# Patient Record
Sex: MALE | Race: WHITE
[De-identification: names, ages, dates, MRNs, and addresses within clinical notes are randomized per-mention and may not be internally consistent; named-entity substitution may affect disease eponyms.]

---

## 2020-03-27 ENCOUNTER — HOSPITAL ENCOUNTER (OUTPATIENT)
Dept: HOSPITAL 76 - SDS | Age: 60
Discharge: HOME | End: 2020-03-27
Attending: ORTHOPAEDIC SURGERY
Payer: COMMERCIAL

## 2020-03-27 VITALS — DIASTOLIC BLOOD PRESSURE: 76 MMHG | SYSTOLIC BLOOD PRESSURE: 130 MMHG

## 2020-03-27 DIAGNOSIS — M19.011: ICD-10-CM

## 2020-03-27 DIAGNOSIS — S46.011A: Primary | ICD-10-CM

## 2020-03-27 DIAGNOSIS — I10: ICD-10-CM

## 2020-03-27 DIAGNOSIS — Z87.891: ICD-10-CM

## 2020-03-27 DIAGNOSIS — S46.111A: ICD-10-CM

## 2020-03-27 NOTE — OPERATIVE REPORT
Operative Report





- Other


Other Information/Narrative: 


Date of Surgery: 27 March 2020





Pre-Op Diagnosis: Acute, traumatic supraspinatus, infraspinatus, subscapularis, 

and biceps tendon tears





Procedure: Right shoulder arthroscopy with subscapularis repair.  Open biceps 

tenodesis.  Open repair of supraspinatus and infraspinatus





Postop Diagnosis: Same





Primary Surgeon: Anthony Coronado





Secondary Surgeon: Abiodun Chen





Complications: None





EBL: 50





IMPLANTS: 


Arthrex 5.5 mm corkscrew x4


Arthrex swivel lock x2


Arthrex fiber tack x1





POSTOPERATIVE PLAN:


0-2 weeks-Sling at all times.  Pendulum exercises 5 times per day.  


2-6 weeks-Passive range of motion.  External rotation to neutral.  Abduction to 

90.  Forward flexion to 90 with the palm down.  No active flexion of the elbow.


6-16 weeks-Active and passive range of motion but no strengthening.


16 weeks and beyond-gradual strengthening of the rotator cuff





EXAMINATION UNDER ANESTHESIA:


ROM: Full


Anterior load and shift: Normal


Posterior load and shift: Normal


Inferior sulcus: Normal





ARTHROSCOPIC FINDINGS: 


Rotator interval: Disruption of the rotator cuff leading to abnormality of the 

interval


Biceps tendon & SLAP: Partial tear of the biceps tendon that was longitudinal.  

Biceps tenodesis performed


Subscapularis: Partial tear of the upper one half of the tendon, this was 

repaired


Rotator Cuff: Complete tear of supraspinatus and infraspinatus with a 

longitudinal split between the 2.  These were repaired


HAGL: Normal


Labrum: Mild degenerative change


Glenoid Cartilage: Mild degenerative change


Humeral Head Cartilage: Mild degenerative change





INDICATION FOR SURGERY: 59-year-old male fell while skiing on 12 March and 

sustained a traumatic tear of the rotator cuff and subsequently had inability to

lift his arms.  MRI confirmed these findings.  He was indicated for operative 

management urgently to allow for acute repair of these tendon avulsions and 

prevent them from becoming an ear repairable tear requiring shoulder 

arthroplasty.  The risks, benefits, and alternatives were discussed.  Risks 

included pain, bleeding, infection, damage to nearby structures, lack of symptom

relief, implant complications, stiffness, need for further surgeries, DVT, PE, 

stroke, and even death.  He signed a written consent form.





PROCEDURE IN DETAIL: The patient was met in the preoperative holding on the day 

of the procedure.  Operative extremity was signed.  Consent was verified.  They 

desired to proceed.  Regional anesthesia was obtained in the preoperative area. 

They were brought to the operating room and surrendered to anesthesia.  Once 

general anesthesia was obtained they were placed in the lateral decubitus 

position with the operative side up.  An axillary roll was placed and all bony 

prominences were well-padded.  A surgical timeout was held to confirm the 

patient procedure, identity, procedure, laterality, allergies, images, and 

antibiotics.  All were in agreement we proceeded.  





A standard diagnostic arthroscopy was performed utilizing posterior and 

anterosuperior portals.  The anterosuperior portal was created under direct 

visualization and localized with a spinal needle.  The 7 mm cannula was placed 

anteriorly.  The findings of the diagnostic arthroscopy can be found above.  The

biceps tendon was taken from its insertion on the labrum.  General debridement 

of the joint with a shaver and burner was done for visualization.





I then performed the repair of the subscapularis.  The rotator interval was 

opened with a shaver and burner and the backside of the coracoid was identified 

and cleared from underlying tissue.  There was no subcoracoid impingement.  A 3 

sided release of the subscapularis was performed using an elevator.  A 

[place] confirmed that the subscapularis could be reduced nicely without 

undue tension on the middle glenohumeral ligament.  A mid glenoid portal was 

created with a good trajectory for subscapularis anchors.  I then debrided to 

the insertion site and created a bed of bleeding bone.  A single triple loaded 

anchor was then placed into the bone at the level of the upper one third of the 

tendon.  I then used the crescent lasso to pass the suture in the appropriate 

locations creating an inferior horizontal mattress, and a superior horizontal 

mattress, and a cinch stitch over the superior border.  After all sutures were 

passed I then tied from inferior to superior and noted the tendon reduced very 

nicely and was well fixed at its anatomic footprint.  This was tested with a 

probe and final pictures were then taken.





SUBACROMIAL DECOMPRESSION: The instruments and cannula were then removed from 

the glenohumeral joint.  The scope trocar was placed in the posterior portal and

the acromion was felt.  It was then inserted just under the acromion scraping 

along the bone until the CA ligament was felt.  The scope trocar was then 

brought just lateral to the CA ligament and out the anterior incision.  The 

cannula was then brought over the scope trocar arthroscope were inserted.  The 

arthroscope was backed up until the shaver and arthroscope in the subacromial 

space.  I then systemically debrided the bursa using a sucker shaver and 

radiofrequency ablation wand.  A direct lateral incision was made and the 

bursectomy and decompression was completed through the lateral incision.  All 

soft tissue was debrided from the underside of the acromion and the posterior 

edge of the CA ligament was lifted.  Care was taken to keep the deltoid fascia 

intact.  I identified in the shape and pattern of the rotator cuff tear and 

found the supraspinatus to be completely off and the infraspinatus to be 

completely off.  I freed up the tendon of the supraspinatus and infraspinatus 

and ensured that it could be reduced to its footprint.





I then created a longitudinal incision laterally over the deltoid in line with 

the rotator cuff.  I did not extend this incision beyond 5 cm from the acromial 

edge.  A deltoid raphae was used and exploited and the sub-deltoid fascia was 

opened.  The Derra retractor was used to debride the underside of the acromion 

and to finalize the mobilization of the cuff.  I then released the subdeltoid 

fascia all the way up to the acromion and retractors were placed.  The greater 

tuberosity was debrided of all soft tissue and abraded until it was a bed of 

bleeding bone.  I then placed traction sutures in the infraspinatus and 

supraspinatus to ensure good mobility.  I then planned my repair.  I 

sequentially placed an anterior, central, and posterior medial row anchor 

ensuring to leave the bare area under the infraspinatus.  I sequentially passed 

my sutures from anterior to posterior incorporating the comma tissue and 

supraspinatus into my first anchor, the supraspinatus and infraspinatus into the

central anchor, and the infraspinatus in the posterior anchor.  I then tied the 

medial row and found excellent compression and anatomic reduction.  The marginal

tear had reduced nicely and there was slight overlapping of the supraspinatus 

and infraspinatus.  I then  1 suture from each not leaving me with 6 

sutures for each lateral row anchor.  The anterior lateral row anchor was put 

into place and each suture was tensioned individually then the anchor was fixed.

 I repeated this posteriorly.  The repair was excellent and moved with the 

humeral head through a near full range of motion.





MINI OPEN BICEPS TENODESIS: A 5 cm incision was made near the axillary fold 

centered over the pectoralis major tendon.  Electrocautery was used to obtain 

hemostasis.  The fascia was opened with dissection scissors.  Blunt digital 

dissection was used to identify the intertubercular groove just under the 

pectoralis major tendon.  The long head of the biceps tendon was visualized 

within this interval.  The short head of the biceps was retracted with my finger

and the right angle was used to deliver the tendon of the long head of the 

biceps out of the wound.  A key elevator was then used to debride all synovial 

tissue from the intertubercular groove.  A fibertack was placed high within the 

groove.  Both limbs of the fibertack were pulled on and it was well fixed.  I 

then whipstitched the biceps tendon starting 2 cm proximal to the 

musculotendinous junction down to the musculotendinous junction and back up to 

the same 2 cm location with a single limb of the suture tack.  The other suture 

was placed once through the tendon at the 2 cm location.  I then cut all excess 

tendon off.  The suture limb that was passed the single time was then pulled on 

and this reduced the tendon nicely into the groove.  The elbow was fully 

straightened and there was no excess tension on the repair site.  I then tied 7 

reverse half hitches alternating to secure the tendon in its place.  All wounds 

were then irrigated copiously.





The portal sites were then closed with 3-0 Monocryl buried.  Any open incisions 

were closed with 2-0 Vicryl in the dermis and a running 3-0 Monocryl in the 

skin.  Mastisol and Steri-Strips were applied.  A sterile dressing and a sling 

was applied.  A sling was placed.  The patient was awakened and transferred to 

the recovery room.

## 2020-03-27 NOTE — ANESTHESIA
Pre-Anesthesia VS, & Labs





- Diagnosis


right shoulder RC tear








- Procedure





right shoulder rotator cuff repair, open biceps tenodesis


Vital Signs: 





                                        











Temp Pulse Resp BP Pulse Ox


 


 36.6 C   104 H  16   179/103 H  98 


 


 03/27/20 06:31  03/27/20 06:31  03/27/20 06:31  03/27/20 06:31  03/27/20 06:31














                                        





Height                           5 ft 5 in


Weight (kg)                      86 kg


Body Mass Index                  27.4











- NPO


>8 hours





Home Medications and Allergies





                                        





No Known Home Medications  04/26/17 





see H and P


Allergies/Adverse Reactions: 


                                    Allergies











Allergy/AdvReac Type Severity Reaction Status Date / Time


 


Sulfa (Sulfonamide Allergy  Unknown Verified 03/25/20 11:33





Antibiotics)     














Anes History & Medical History





- Anesthetic History


Anesthesia Complications: reports: No previous complications





- Medical History


Cardiovascular: reports: Hypertension, Other (right bundle branch block)


Pulmonary: reports: None


Gastrointestinal: reports: None


Urinary: reports: None


Musculoskeletal: reports: Other


Endocrine/Autoimmune: reports: None


Blood Disorders: reports: None


Skin: reports: None


Smoking Status: Former smoker





- Surgical History


Orthopedic: Hip replacement





Exam


General: Alert


Dental: WNL


Mouth Opening: Greater than 4 Fingerbreadths


Mallampati classification: II


Thyromental Distance: greater than 6 cm


Respiratory: Lungs clear


Cardiovascular: Other (3/6 murmur)





Plan


Anesthesia Type: General, Interscalene Block


Consent for Procedure(s) Verified and Reviewed: Yes


Code Status: Attempt Resuscitation


ASA classification: 2-Mild systemic disease


Is this case an emergency?: No